# Patient Record
Sex: MALE
[De-identification: names, ages, dates, MRNs, and addresses within clinical notes are randomized per-mention and may not be internally consistent; named-entity substitution may affect disease eponyms.]

---

## 2018-06-08 ENCOUNTER — HOSPITAL ENCOUNTER (EMERGENCY)
Dept: HOSPITAL 31 - C.ER | Age: 33
Discharge: HOME | End: 2018-06-08
Payer: SELF-PAY

## 2018-06-08 VITALS
HEART RATE: 105 BPM | RESPIRATION RATE: 20 BRPM | TEMPERATURE: 98.6 F | DIASTOLIC BLOOD PRESSURE: 85 MMHG | SYSTOLIC BLOOD PRESSURE: 134 MMHG

## 2018-06-08 VITALS — OXYGEN SATURATION: 100 %

## 2018-06-08 DIAGNOSIS — I10: Primary | ICD-10-CM

## 2018-06-08 LAB
ALBUMIN SERPL-MCNC: 4.3 G/DL (ref 3.5–5)
ALBUMIN/GLOB SERPL: 1.1 {RATIO} (ref 1–2.1)
ALT SERPL-CCNC: 58 U/L (ref 21–72)
AST SERPL-CCNC: 44 U/L (ref 17–59)
BASOPHILS # BLD AUTO: 0.1 K/UL (ref 0–0.2)
BASOPHILS NFR BLD: 0.7 % (ref 0–2)
BILIRUB UR-MCNC: NEGATIVE MG/DL
BNP SERPL-MCNC: < 11.1 PG/ML (ref 0–450)
BUN SERPL-MCNC: 13 MG/DL (ref 9–20)
CALCIUM SERPL-MCNC: 9.2 MG/DL (ref 8.6–10.4)
EOSINOPHIL # BLD AUTO: 0.6 K/UL (ref 0–0.7)
EOSINOPHIL NFR BLD: 6.4 % (ref 0–4)
ERYTHROCYTE [DISTWIDTH] IN BLOOD BY AUTOMATED COUNT: 13.4 % (ref 11.5–14.5)
GFR NON-AFRICAN AMERICAN: > 60
GLUCOSE UR STRIP-MCNC: (no result) MG/DL
HGB BLD-MCNC: 14.6 G/DL (ref 12–18)
LEUKOCYTE ESTERASE UR-ACNC: (no result) LEU/UL
LYMPHOCYTES # BLD AUTO: 1.8 K/UL (ref 1–4.3)
LYMPHOCYTES NFR BLD AUTO: 21 % (ref 20–40)
MCH RBC QN AUTO: 30.1 PG (ref 27–31)
MCHC RBC AUTO-ENTMCNC: 35.3 G/DL (ref 33–37)
MCV RBC AUTO: 85.2 FL (ref 80–94)
MONOCYTES # BLD: 0.6 K/UL (ref 0–0.8)
MONOCYTES NFR BLD: 6.6 % (ref 0–10)
NEUTROPHILS # BLD: 5.7 K/UL (ref 1.8–7)
NEUTROPHILS NFR BLD AUTO: 65.3 % (ref 50–75)
NRBC BLD AUTO-RTO: 0 % (ref 0–2)
PH UR STRIP: 6 [PH] (ref 5–8)
PLATELET # BLD: 227 K/UL (ref 130–400)
PMV BLD AUTO: 8.3 FL (ref 7.2–11.7)
PROT UR STRIP-MCNC: (no result) MG/DL
RBC # BLD AUTO: 4.86 MIL/UL (ref 4.4–5.9)
RBC # UR STRIP: NEGATIVE /UL
SP GR UR STRIP: 1.01 (ref 1–1.03)
SQUAMOUS EPITHIAL: 1 /HPF (ref 0–5)
UROBILINOGEN UR-MCNC: NORMAL MG/DL (ref 0.2–1)
WBC # BLD AUTO: 8.7 K/UL (ref 4.8–10.8)

## 2018-06-08 PROCEDURE — 82948 REAGENT STRIP/BLOOD GLUCOSE: CPT

## 2018-06-08 PROCEDURE — 80053 COMPREHEN METABOLIC PANEL: CPT

## 2018-06-08 PROCEDURE — 85025 COMPLETE CBC W/AUTO DIFF WBC: CPT

## 2018-06-08 PROCEDURE — 83880 ASSAY OF NATRIURETIC PEPTIDE: CPT

## 2018-06-08 PROCEDURE — 81001 URINALYSIS AUTO W/SCOPE: CPT

## 2018-06-08 PROCEDURE — 99285 EMERGENCY DEPT VISIT HI MDM: CPT

## 2018-06-08 PROCEDURE — 84484 ASSAY OF TROPONIN QUANT: CPT

## 2018-06-08 PROCEDURE — 71045 X-RAY EXAM CHEST 1 VIEW: CPT

## 2018-06-08 NOTE — C.PDOC
History Of Present Illness


33 year old male, whose PMHx includes HTN, presents to the ED for evaluation of 

dizziness and elevated blood pressure. Patient states he has never taken 

hypertension medication. He admits to heavy drinking, but denies drinking 

within the past 5 days. Patient is a day  in a construction job. He 

denies fever, chills, chest pain, shortness of breath. 


Time Seen by Provider: 18 19:42


Chief Complaint (Nursing): High Blood Pressure


History Per: Patient


History/Exam Limitations: no limitations


Current Symptoms Are (Timing): Still Present


Associated Symptoms: denies: Chest Pain


Additional History Per: Patient





Past Medical History


Reviewed: Historical Data, Nursing Documentation, Vital Signs


Vital Signs: 


 Last Vital Signs











Temp  98.6 F   18 21:03


 


Pulse  105 H  18 21:03


 


Resp  20   18 21:03


 


BP  134/85   18 21:03


 


Pulse Ox  100   18 21:40














- Medical History


PMH: No Chronic Diseases


Surgical History: No Surg Hx


Family History: States: Unknown Family Hx





- Social History


Hx Alcohol Use: Yes


Hx Substance Use: No





- Immunization History


Hx Tetanus Toxoid Vaccination: No


Hx Influenza Vaccination: No


Hx Pneumococcal Vaccination: No





Review Of Systems


Cardiovascular: Positive for: Other (high blood pressure ).  Negative for: 

Chest Pain


Respiratory: Negative for: Shortness of Breath


Neurological: Positive for: Dizziness





Physical Exam





- Physical Exam


Appears: Non-toxic, No Acute Distress, Other (obese  male )


Skin: Normal Color, Warm, Dry


Head: Atraumatic, Normacephalic


Eye(s): bilateral: Normal Inspection


Oral Mucosa: Moist


Neck: Supple


Chest: Symmetrical, No Deformity, No Tenderness


Cardiovascular: Rhythm Regular, No Murmur


Respiratory: Normal Breath Sounds, No Rales, No Rhonchi, No Wheezing


Extremity: Normal ROM, Capillary Refill (less than 2 seconds )


Neurological/Psych: Oriented x3, Normal Speech, Normal Cognition


Gait: Steady





ED Course And Treatment





- Laboratory Results


Result Diagrams: 


 18 20:08





 18 20:08


Lab Interpretation: Normal (trop neg.)


ECG: Interpreted By Me


ECG Rhythm: Sinus Tachycardia


ECG Interpretation: Normal


Rate From EC


O2 Sat by Pulse Oximetry: 100 (on RA)


Pulse Ox Interpretation: Normal





- Radiology


CXR: Interpreted by Me


CXR Interpretation: Yes: No Acute Disease


Progress Note: asa, lisinopril 20 mg PO


Reevaluation Time: 20:48


Reassessment Condition: Improved





Medical Decision Making


Medical Decision Making: 





poorly controlled HTN


morbid obesity


cannabis abuse


occasional alcohol abuse





diet  and exercise educated








Disposition


Doctor Will See Patient In The: Office


Counseled Patient/Family Regarding: Studies Performed, Diagnosis





- Disposition


Referrals: 


[facility] [Outside]


[facility] [Outside]


[facility] [Outside]


Disposition: HOME/ ROUTINE


Disposition Time: 20:49


Condition: GOOD


Additional Instructions: 


sigue Vasotec 20 mg diario para controllar tucker hipertension





Sigue con ejercisio cardiovascular


baja de peso


russell el excesso del alcohol





Sigue en [facility]- en menos que 2 meses para re-evaluar tucker 

pression johan.





Prescriptions: 


Enalapril Maleate [Vasotec] 20 mg PO DAILY #30 tab


Instructions:  Obesity, Adult, High Blood Pressure in Adults, Metabolic Syndrome


Forms:  Blend (English)


Print Language: Divehi





- Clinical Impression


Clinical Impression: 


 Hypertension








- Scribe Statement


The provider has reviewed the documentation as recorded by the Scribe (Martha Pastor)


Provider Attestation: 








All medical record entries made by the Scribe were at my direction and 

personally dictated by me. I have reviewed the chart and agree that the record 

accurately reflects my personal performance of the history, physical exam, 

medical decision making, and the department course for this patient. I have 

also personally directed, reviewed, and agree with the discharge instructions 

and disposition.